# Patient Record
Sex: MALE | Race: WHITE
[De-identification: names, ages, dates, MRNs, and addresses within clinical notes are randomized per-mention and may not be internally consistent; named-entity substitution may affect disease eponyms.]

---

## 2020-09-01 ENCOUNTER — HOSPITAL ENCOUNTER (EMERGENCY)
Dept: HOSPITAL 41 - JD.ED | Age: 30
Discharge: HOME | End: 2020-09-01
Payer: COMMERCIAL

## 2020-09-01 VITALS — DIASTOLIC BLOOD PRESSURE: 106 MMHG | HEART RATE: 78 BPM | SYSTOLIC BLOOD PRESSURE: 153 MMHG

## 2020-09-01 DIAGNOSIS — H10.32: Primary | ICD-10-CM

## 2020-09-01 NOTE — EDM.PDOC
ED HPI GENERAL MEDICAL PROBLEM





- General


Chief Complaint: Eye Problems


Stated Complaint: EYE PAIN


Time Seen by Provider: 09/01/20 20:14


Source of Information: Reports: Patient


History Limitations: Reports: No Limitations





- History of Present Illness


INITIAL COMMENTS - FREE TEXT/NARRATIVE: 


29-year-old male presents to the ED for evaluation of left eye pain.  He states 

this is been intermittent more burning sharp and stabbing but also a pressure 

component to the eye pain over the last 2 to 3 days.  He reports increased 

clearish discharge from the eye over the last 2 days.  Works in the oil field 

long hours but does not believe he got anything in his eye.  The eye is mildly 

sensitive to light.  He wears sunglasses all day long.  He has appreciated 

increased purulent discharge in the medial canthus of the eye particularly 

noticed in the mornings the last 2 days.  The eye is not itching or feel like it

needs to be rubbed.  He does not wear contact lenses.  States he has strabismus 

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

he has not undergone any surgical correction.





Onset: Gradual


Onset Date: 08/29/20


Duration: Day(s):, Getting Worse


Location: Reports: Face (Left eye burning discomfort)


Quality: Reports: Ache, Burning


Severity: Moderate (Remittent pain.)


Improves with: Reports: None


Worsens with: Reports: Other


Context: Denies: Activity (Being at the sunlight and standing up bending over 

seems to make the pressure or pain worse.), Exercise, Lifting, Sick Contact, 

Trauma, Other


Associated Symptoms: Denies: No Other Symptoms, Confusion, Chest Pain, Cough, 

cough w sputum, Diaphoresis, Fever/Chills, Headaches, Loss of Appetite, Malaise,

Shortness of Breath, Syncope


Treatments PTA: Reports: Other (see below) (1.)





- Related Data


                                    Allergies











Allergy/AdvReac Type Severity Reaction Status Date / Time


 


No Known Allergies Allergy   Verified 09/01/20 20:10











Home Meds: 


                                    Home Meds





Gentamicin [Garamycin 0.3% Ophth Soln] 5 ml EYELF QID #1 bottle 09/01/20 [Rx]











Past Medical History





- Past Health History


Medical/Surgical History: Denies Medical/Surgical History


HEENT History: Reports: None


Cardiovascular History: Reports: None


Respiratory History: Reports: None


Gastrointestinal History: Reports: None


Genitourinary History: Reports: None


Neurological History: Reports: None


Psychiatric History: Reports: None


Endocrine/Metabolic History: Reports: None


Hematologic History: Reports: None


Immunologic History: Reports: None


Oncologic (Cancer) History: Reports: None


Dermatologic History: Reports: None





- Infectious Disease History


Infectious Disease History: Reports: None





- Past Surgical History


Musculoskeletal Surgical History: Reports: Other (See Below)


Other Musculoskeletal Surgeries/Procedures:: Right Pinky Surgery





Social & Family History





- Family History


Family Medical History: Noncontributory





- Tobacco Use


Smoking Status *Q: Never Smoker





- Caffeine Use


Caffeine Use: Reports: Coffee, Soda





- Recreational Drug Use


Recreational Drug Use: No





- Living Situation & Occupation


Living situation: Reports: Single


Occupation: Employed





ED ROS GENERAL





- Review of Systems


Review Of Systems: See Below


Constitutional: Reports: Fatigue.  Denies: Fever, Chills, Malaise, Weakness, 

Weight Loss


HEENT: Reports: Eye Discharge (Greenish discharge as well as clear discharge 

from the left eye for the last couple of days.), Glasses.  Denies: Contact Lens

es (Chronically as he works 100 hours a week gets very little sleep.)


Respiratory: Reports: No Symptoms


Cardiovascular: Reports: No Symptoms


Endocrine: Reports: No Symptoms


GI/Abdominal: Reports: No Symptoms


: Reports: No Symptoms


Musculoskeletal: Reports: No Symptoms


Skin: Reports: No Symptoms


Neurological: Reports: No Symptoms


Psychiatric: Reports: No Symptoms


Hematologic/Lymphatic: Reports: No Symptoms


Immunologic: Reports: No Symptoms





ED EXAM GENERAL W FULL EYE





- Physical Exam


Exam: See Below


Exam Limited By: No Limitations


General Appearance: Alert, WD/WN, No Apparent Distress, Other (Temperature is 

36.4 heart rate is 78 and sinus respiratory was 16 with O2 sats of 99% room air 

BP initially was elevated 153 106 but came down nicely to 138 94 before 

discharge.)


Eye Exam: Left Eye: Conjunctival Injection (Moderate particularly the blepharal 

margins of the eyelids.), Normal Fundi, PERRL


Visual Acuity (R) 20/: 13


Visual Acuity (L) 20/: 13


With Correction: No


Eyelids: Left: Lid Everted for Exam (No foreign bodies identified.), Bilateral: 

Erythema


Conjunctiva & Sclera: Left: Injected (Mildly but more so on the blepharal 

margins of both upper and lower eyelids.)


Cornea Exam: Bilateral: Normal Appearance


Extraocular Movements: Bilateral: Intact


Pupillary Size: Bilateral: 7 mm


Pupillary Reaction: Bilateral: Brisk (With no eye pain on the left side when 

shining the light into the right pupil.)


Anterior Chamber: Left: Normal Appearance


Posterior Chamber: Left: Normal Funduscopic


Comments: 


Purulent greenish discharge from the medial canthus of the left eye appreciated.

  Removed with a Q-tip.








Course





- Vital Signs


Last Recorded V/S: 


                                Last Vital Signs











Temp  36.4 C   09/01/20 20:07


 


Pulse  78   09/01/20 20:07


 


Resp  16   09/01/20 20:07


 


BP  153/106 H  09/01/20 20:07


 


Pulse Ox  99   09/01/20 20:07














- Radiology Interpretation


Free Text/Narrative:: 


29-year-old male presents to the ED with intermittent sharp pain in his left eye

 and pressure.  No change in visual acuity.  Symptoms have been present for last

 couple of days.  Associated with green purulent discharge most notable for the 

patient first thing in the morning.  Patient works in the oil field but cannot 

recall getting anything in his eye.  Examination revealed feels the fundi and 

corneas to be normal.  There is diffuse inflammation of the upper and lower 

blepharal margins of the left eye with purulent greenish discharge medial 

canthus of the eye.  Mild diffuse conjunctivitis.  No corneal abrasions or 

foreign bodies identified.  Treated with gentamicin eyedrops 2 drops to the left

 eye 4 times daily for the next 4 days to clear up infection.  He is to follow-

up if not markedly improved in  36 hours time.








Departure





- Departure


Time of Disposition: 20:24


Disposition: Home, Self-Care 01


Condition: Fair


Clinical Impression: 


 Bacterial conjunctivitis of left eye





Conjunctivitis


Qualifiers:


 Conjunctivitis type: acute Acute conjunctivitis type: bacterial Laterality: 

left Qualified Code(s): H10.32 - Unspecified acute conjunctivitis, left eye








- Discharge Information


*PRESCRIPTION DRUG MONITORING PROGRAM REVIEWED*: Not Applicable


*COPY OF PRESCRIPTION DRUG MONITORING REPORT IN PATIENT LISY: Not Applicable


Prescriptions: 


Gentamicin [Garamycin 0.3% Ophth Soln] 5 ml EYELF QID #1 bottle


Instructions:  Bacterial Conjunctivitis, Adult, Easy-to-Read


Referrals: 


PCP,None [Primary Care Provider] - 


Forms:  ED Department Discharge


Additional Instructions: 


Evaluation in the emergency room today in regards to left eye pressure/pain for 

the last 2 days.  Increased discharge from the eye including some purulent 

discharge appreciated as well.  Diffuse redness of the eye appreciated as well 

on your exam.  No history of contact lens wear.  Evaluation in the emergency 

room reveals purulent greenish discharge in the medial canthus of the eye.  

Inflammation of both upper and lower blepharal conjunctiva  on examination

with no foreign bodies identified.  The cornea is clear.  Clinically the 

intraocular pressure is about equal.  No clinical evidence of glaucoma.  

Treatment to be antibiotic drops Garamycin 2 drops to the left eye 4 times daily

for the next 4 days to clear up infection.  Expect marked improvement over the 

next 24 to 36 hours.  If not you should be reviewed by an eye 

doctor/optometrist. 





Sepsis Event Note (ED)





- Evaluation


Sepsis Screening Result: No Definite Risk





- Focused Exam


Vital Signs: 


                                   Vital Signs











  Temp Pulse Resp BP Pulse Ox


 


 09/01/20 20:07  36.4 C  78  16  153/106 H  99